# Patient Record
Sex: MALE | Race: WHITE | URBAN - METROPOLITAN AREA
[De-identification: names, ages, dates, MRNs, and addresses within clinical notes are randomized per-mention and may not be internally consistent; named-entity substitution may affect disease eponyms.]

---

## 2020-06-28 ENCOUNTER — HOSPITAL ENCOUNTER (EMERGENCY)
Facility: CLINIC | Age: 25
Discharge: HOME | End: 2020-06-28
Attending: EMERGENCY MEDICINE | Admitting: EMERGENCY MEDICINE
Payer: COMMERCIAL

## 2020-06-28 ENCOUNTER — APPOINTMENT (OUTPATIENT)
Dept: CT IMAGING | Facility: CLINIC | Age: 25
End: 2020-06-28
Attending: EMERGENCY MEDICINE
Payer: COMMERCIAL

## 2020-06-28 ENCOUNTER — APPOINTMENT (OUTPATIENT)
Dept: GENERAL RADIOLOGY | Facility: CLINIC | Age: 25
End: 2020-06-28
Attending: EMERGENCY MEDICINE
Payer: COMMERCIAL

## 2020-06-28 VITALS
HEIGHT: 70 IN | HEART RATE: 98 BPM | RESPIRATION RATE: 13 BRPM | SYSTOLIC BLOOD PRESSURE: 135 MMHG | WEIGHT: 190 LBS | OXYGEN SATURATION: 96 % | BODY MASS INDEX: 27.2 KG/M2 | DIASTOLIC BLOOD PRESSURE: 83 MMHG | TEMPERATURE: 98.8 F

## 2020-06-28 DIAGNOSIS — J18.9 PNEUMONIA OF RIGHT UPPER LOBE DUE TO INFECTIOUS ORGANISM: ICD-10-CM

## 2020-06-28 DIAGNOSIS — T40.601A OPIATE OVERDOSE, ACCIDENTAL OR UNINTENTIONAL, INITIAL ENCOUNTER (H): ICD-10-CM

## 2020-06-28 PROBLEM — R76.8 HEPATITIS C ANTIBODY TEST POSITIVE: Status: ACTIVE | Noted: 2018-08-01

## 2020-06-28 PROBLEM — F90.9 ADHD (ATTENTION DEFICIT HYPERACTIVITY DISORDER): Status: ACTIVE | Noted: 2020-06-28

## 2020-06-28 PROBLEM — F19.20 CHEMICAL DEPENDENCY (H): Status: ACTIVE | Noted: 2020-06-28

## 2020-06-28 LAB
ALBUMIN SERPL-MCNC: 4.4 G/DL (ref 3.4–5)
ALP SERPL-CCNC: 63 U/L (ref 40–150)
ALT SERPL W P-5'-P-CCNC: 147 U/L (ref 0–70)
AMPHETAMINES UR QL SCN: NEGATIVE
ANION GAP SERPL CALCULATED.3IONS-SCNC: 7 MMOL/L (ref 3–14)
APAP SERPL-MCNC: <2 MG/L (ref 10–20)
AST SERPL W P-5'-P-CCNC: 118 U/L (ref 0–45)
BARBITURATES UR QL: NEGATIVE
BASOPHILS # BLD AUTO: 0 10E9/L (ref 0–0.2)
BASOPHILS NFR BLD AUTO: 0.1 %
BENZODIAZ UR QL: NEGATIVE
BILIRUB SERPL-MCNC: 0.6 MG/DL (ref 0.2–1.3)
BUN SERPL-MCNC: 22 MG/DL (ref 7–30)
CALCIUM SERPL-MCNC: 8.5 MG/DL (ref 8.5–10.1)
CANNABINOIDS UR QL SCN: NEGATIVE
CHLORIDE SERPL-SCNC: 97 MMOL/L (ref 94–109)
CO2 SERPL-SCNC: 28 MMOL/L (ref 20–32)
COCAINE UR QL: NEGATIVE
CREAT SERPL-MCNC: 1.02 MG/DL (ref 0.66–1.25)
DIFFERENTIAL METHOD BLD: ABNORMAL
EOSINOPHIL # BLD AUTO: 0 10E9/L (ref 0–0.7)
EOSINOPHIL NFR BLD AUTO: 0.1 %
ERYTHROCYTE [DISTWIDTH] IN BLOOD BY AUTOMATED COUNT: 12.8 % (ref 10–15)
ETHANOL SERPL-MCNC: <0.01 G/DL
GFR SERPL CREATININE-BSD FRML MDRD: >90 ML/MIN/{1.73_M2}
GLUCOSE SERPL-MCNC: 226 MG/DL (ref 70–99)
HCT VFR BLD AUTO: 41.2 % (ref 40–53)
HGB BLD-MCNC: 14.2 G/DL (ref 13.3–17.7)
IMM GRANULOCYTES # BLD: 0.1 10E9/L (ref 0–0.4)
IMM GRANULOCYTES NFR BLD: 0.5 %
INTERPRETATION ECG - MUSE: NORMAL
LYMPHOCYTES # BLD AUTO: 1.1 10E9/L (ref 0.8–5.3)
LYMPHOCYTES NFR BLD AUTO: 5.7 %
MCH RBC QN AUTO: 30.3 PG (ref 26.5–33)
MCHC RBC AUTO-ENTMCNC: 34.5 G/DL (ref 31.5–36.5)
MCV RBC AUTO: 88 FL (ref 78–100)
MONOCYTES # BLD AUTO: 1.2 10E9/L (ref 0–1.3)
MONOCYTES NFR BLD AUTO: 6 %
NEUTROPHILS # BLD AUTO: 16.7 10E9/L (ref 1.6–8.3)
NEUTROPHILS NFR BLD AUTO: 87.6 %
NRBC # BLD AUTO: 0 10*3/UL
NRBC BLD AUTO-RTO: 0 /100
OPIATES UR QL SCN: POSITIVE
PCP UR QL SCN: NEGATIVE
PLATELET # BLD AUTO: 332 10E9/L (ref 150–450)
POTASSIUM SERPL-SCNC: 3.1 MMOL/L (ref 3.4–5.3)
PROT SERPL-MCNC: 8 G/DL (ref 6.8–8.8)
RBC # BLD AUTO: 4.68 10E12/L (ref 4.4–5.9)
SALICYLATES SERPL-MCNC: <2 MG/DL
SODIUM SERPL-SCNC: 132 MMOL/L (ref 133–144)
WBC # BLD AUTO: 19.1 10E9/L (ref 4–11)

## 2020-06-28 PROCEDURE — 99285 EMERGENCY DEPT VISIT HI MDM: CPT | Mod: 25

## 2020-06-28 PROCEDURE — 96374 THER/PROPH/DIAG INJ IV PUSH: CPT | Mod: 59

## 2020-06-28 PROCEDURE — 25000125 ZZHC RX 250: Performed by: EMERGENCY MEDICINE

## 2020-06-28 PROCEDURE — 25000132 ZZH RX MED GY IP 250 OP 250 PS 637: Performed by: EMERGENCY MEDICINE

## 2020-06-28 PROCEDURE — 93005 ELECTROCARDIOGRAM TRACING: CPT

## 2020-06-28 PROCEDURE — 25000128 H RX IP 250 OP 636: Performed by: EMERGENCY MEDICINE

## 2020-06-28 PROCEDURE — 96375 TX/PRO/DX INJ NEW DRUG ADDON: CPT | Mod: 59

## 2020-06-28 PROCEDURE — 80329 ANALGESICS NON-OPIOID 1 OR 2: CPT | Performed by: EMERGENCY MEDICINE

## 2020-06-28 PROCEDURE — 85025 COMPLETE CBC W/AUTO DIFF WBC: CPT | Performed by: EMERGENCY MEDICINE

## 2020-06-28 PROCEDURE — 80329 ANALGESICS NON-OPIOID 1 OR 2: CPT | Mod: 91 | Performed by: EMERGENCY MEDICINE

## 2020-06-28 PROCEDURE — 25000128 H RX IP 250 OP 636

## 2020-06-28 PROCEDURE — 80320 DRUG SCREEN QUANTALCOHOLS: CPT | Performed by: EMERGENCY MEDICINE

## 2020-06-28 PROCEDURE — 80053 COMPREHEN METABOLIC PANEL: CPT | Performed by: EMERGENCY MEDICINE

## 2020-06-28 PROCEDURE — 71046 X-RAY EXAM CHEST 2 VIEWS: CPT

## 2020-06-28 PROCEDURE — 80307 DRUG TEST PRSMV CHEM ANLYZR: CPT | Performed by: EMERGENCY MEDICINE

## 2020-06-28 PROCEDURE — 71275 CT ANGIOGRAPHY CHEST: CPT

## 2020-06-28 RX ORDER — IOPAMIDOL 755 MG/ML
60 INJECTION, SOLUTION INTRAVASCULAR ONCE
Status: COMPLETED | OUTPATIENT
Start: 2020-06-28 | End: 2020-06-28

## 2020-06-28 RX ORDER — NALOXONE HYDROCHLORIDE 0.4 MG/ML
0.2 INJECTION, SOLUTION INTRAMUSCULAR; INTRAVENOUS; SUBCUTANEOUS ONCE
Status: COMPLETED | OUTPATIENT
Start: 2020-06-28 | End: 2020-06-28

## 2020-06-28 RX ORDER — AZITHROMYCIN 250 MG/1
500 TABLET, FILM COATED ORAL ONCE
Status: COMPLETED | OUTPATIENT
Start: 2020-06-28 | End: 2020-06-28

## 2020-06-28 RX ORDER — AZITHROMYCIN 250 MG/1
250 TABLET, FILM COATED ORAL DAILY
Qty: 4 TABLET | Refills: 0 | Status: SHIPPED | OUTPATIENT
Start: 2020-06-28 | End: 2020-07-02

## 2020-06-28 RX ORDER — DIPHENHYDRAMINE HYDROCHLORIDE 50 MG/ML
25 INJECTION INTRAMUSCULAR; INTRAVENOUS ONCE
Status: COMPLETED | OUTPATIENT
Start: 2020-06-28 | End: 2020-06-28

## 2020-06-28 RX ORDER — DIPHENHYDRAMINE HYDROCHLORIDE 50 MG/ML
INJECTION INTRAMUSCULAR; INTRAVENOUS
Status: COMPLETED
Start: 2020-06-28 | End: 2020-06-28

## 2020-06-28 RX ADMIN — DIPHENHYDRAMINE HYDROCHLORIDE 25 MG: 50 INJECTION, SOLUTION INTRAMUSCULAR; INTRAVENOUS at 06:19

## 2020-06-28 RX ADMIN — AZITHROMYCIN MONOHYDRATE 500 MG: 250 TABLET ORAL at 07:11

## 2020-06-28 RX ADMIN — DIPHENHYDRAMINE HYDROCHLORIDE 25 MG: 50 INJECTION INTRAMUSCULAR; INTRAVENOUS at 06:19

## 2020-06-28 RX ADMIN — NALOXONE HYDROCHLORIDE 0.2 MG: 0.4 INJECTION, SOLUTION INTRAMUSCULAR; INTRAVENOUS; SUBCUTANEOUS at 05:12

## 2020-06-28 RX ADMIN — AMOXICILLIN AND CLAVULANATE POTASSIUM 1 TABLET: 875; 125 TABLET, FILM COATED ORAL at 07:10

## 2020-06-28 RX ADMIN — IOPAMIDOL 60 ML: 755 INJECTION, SOLUTION INTRAVENOUS at 06:04

## 2020-06-28 RX ADMIN — SODIUM CHLORIDE 86 ML: 9 INJECTION, SOLUTION INTRAVENOUS at 06:04

## 2020-06-28 ASSESSMENT — MIFFLIN-ST. JEOR: SCORE: 1848.08

## 2020-06-28 NOTE — ED NOTES
Call light answered by Sheree EDT. Patient reports need to urinate. Patient ambulated to BR. Gait stable. Patient given supplies and instructions to collect urine sample. Patient verbalized understanding. Patient unable to void. Patient ambulated back to ED room 18. Patient placed back on ECG NIBP and SaO2 monitoring. Patient's SaO2 79% on RA. Patient placed back on 2 LPM NC. SaO2 improved to 95%. Patient requests pen and paper. Patient provided with pen and paper to write down. EDT reports patient seems to be getting more tired, having difficulty keeping his eyes open. MD Shultz updated.

## 2020-06-28 NOTE — ED NOTES
Patient returned from imaging exam. Patient c/o generalized itching after receiving contrast dye. MD updated. VORB. Patient medicated as ordered. Patient placed back on ECG NIBP and SaO2 monitoring. Patient updated on continued POC and waits. Continue to monitor.

## 2020-06-28 NOTE — ED AVS SNAPSHOT
Emergency Department  [address] [facility] [address]  Phone:  [phone]  Fax:  [phone]                                    Milton Sanchez   MRN: [id number]    Department:   Emergency Department   Date of Visit:  6/28/2020           After Visit Summary Signature Page    I have received my discharge instructions, and my questions have been answered. I have discussed any challenges I see with this plan with the nurse or doctor.    ..........................................................................................................................................  Patient/Patient Representative Signature      ..........................................................................................................................................  Patient Representative Print Name and Relationship to Patient    ..................................................               ................................................  Date                                   Time    ..........................................................................................................................................  Reviewed by Signature/Title    ...................................................              ..............................................  Date                                               Time          22EPIC Rev 08/18

## 2020-06-28 NOTE — ED NOTES
Bed: ED18  Expected date:   Expected time:   Means of arrival:   Comments:  432 24M right overdose. Given narcan awake no covid

## 2020-06-28 NOTE — ED TRIAGE NOTES
Respirations are regular and unlabored. Patient SaO2 on RA in the 80s. Patient placed on 2 LPM NC. SaO2 improved to 94%. Patient denies any cough or shortness of breath. Patient skin is normal color, warm and clammy. Cap refill is less than 3 seconds. Patient GCS 14

## 2020-06-28 NOTE — ED PROVIDER NOTES
"History     Chief Complaint:  Drug Overdose       HPI  Milton Sanchez is a 24 year old year old male who presents for evaluation after apparent overdose.  Pt did admit to smoking heroine tonight.  EMS gave narcan and pt became alert and conversant.  Pt denies any other complaints.  He also denies alcohol use or any other ingestions        Allergies:  No Known Drug Allergies    Medications:   Medications reviewed. No pertinent medications.    Medical History:   ADHD  Chemical dependency  Hepatitis C antibody test positive  Mood disorder    Surgical History   Right arm surgery  Manipulation of nasal fracture    Family History:   Hypertension  Stroke  Depression    Social History:  Patient was not accompanied to the ED.   Smoking Status: Former Smoker   Smokeless Tobacco: Never Used  Alcohol Use: Positive  Drug Use: Positive (heroin very rarely)  Primary Care: No primary care provider on file.       Review of Systems   Constitutional: Negative for fever.   Respiratory: Negative for cough and shortness of breath.    Cardiovascular: Negative for chest pain.   Gastrointestinal: Negative for abdominal pain, nausea and vomiting.   Psychiatric/Behavioral: Negative for self-injury and suicidal ideas.   All other systems reviewed and are negative.      Physical Exam     Patient Vitals for the past 24 hrs:   BP Temp Temp src Heart Rate Resp SpO2 Height Weight   06/28/20 0308 (!) 141/90 98.8  F (37.1  C) Oral 121 20 95 % 1.778 m (5' 10\") 86.2 kg (190 lb)          Physical Exam  General: Appears well-developed and well-nourished.   Head: No signs of trauma.   CV: Mild tachycardia and regular rhythm.    Resp: Effort normal and breath sounds normal. No respiratory distress.   GI: Soft. There is no tenderness.  No rebound or guarding.  Normal bowel sounds.  No CVA tenderness.  MSK: Normal range of motion. no edema. No Calf tenderness.  Neuro: The patient is alert and oriented to person, place, and time.  Strength in upper/lower " extremities normal and symmetrical. Sensation normal. Speech normal.  GCS 15  Skin: Skin is warm and dry. No rash noted.   Psych: normal mood and affect. behavior is normal.       Emergency Department Course     ECG:  ECG taken at 0347, ECG read at 0354  Sinus tachycardia  Otherwise normal ECG  Rate 116 bpm. KY interval 130 ms. QRS duration 100 ms. QT/QTc 320/444 ms. P-R-T axes 41 57 6.    Imaging:  Radiology results were communicated with the patient who voiced understanding of the findings.    Chest XR,  PA & LAT   Final Result   IMPRESSION: Developing consolidative alveolar infiltrate right upper lobe. Findings compatible with developing right upper lobe pneumonia. Continued radiographic follow-up recommended. Left lung clear. No pleural fluid. Normal heart size. Normal    pulmonary vascularity. Osseous structures unremarkable.      CT Chest Pulmonary Embolism w Contrast    (Results Pending)     Reading per radiology     Laboratory:  Laboratory findings were communicated with the patient who voiced understanding of the findings.      Acetaminophen: <2  Salicylate: <2  Alcohol ethyl: <0.01    CBC: WBC 19.1 (H), HGB 14.2,   CMP:  (L), Potassium 3.1 (L), Glucose 226 (H),  (H),  (H) (Creatinine 1.02) o/w WNL     Interventions:   0512 Narcan 0.2 mg IV  Augmentin and azithromycin PO    Emergency Department Course:    0317 Nursing notes and vitals reviewed.    0329 I performed an exam of the patient as documented above.     0336 IV was inserted and blood was drawn for laboratory testing, results above.    0347 EKG obtained as noted above.    0444 The patient was sent for XR while in the emergency department, results above.     0525 The patient was sent for CT while in the emergency department, results above.       0700 Signed out to my partner, Dr. Byrd.    Impression & Plan       Medical Decision Making:  Patient presents after apparent heroin overdose.  Initially the patient was  somewhat hesitant to talk about what had happened, but he ultimately did admit that he had smoked heroin.  He denied any other drug use.  He got Narcan with EMS.  While he was alert and conversant, his O2 sats would go down somewhat so I give him another dose of Narcan.  Did obtain screening blood work and EKG which did show elevated white blood cell count but was otherwise reassuring.  Patient continue to be somewhat hypoxic with ambulation so did obtain a chest x-ray which showed a possible infiltrate.  Given his young age and the hypoxia, decided to obtain a CT scan as well.  This continued to show signs of infiltrate but no other significant lung disease.  Patient's O2 sats continue to be somewhat borderline and we discussed admission.  Patient very much did not desire to be admitted.  While he was alert and conversant, he did still seem somewhat easily tired and so decided to monitor him for a while longer to metabolize whatever he may have taken.  He did receive a dose of antibiotics in the ER will be given a prescription for antibiotics for outpatient use as well.  Patient signed up to Dr. Byrd.        Diagnosis:     ICD-10-CM    1. Opiate overdose, accidental or unintentional, initial encounter (H)  T40.601A Drug abuse screen urine   2. Pneumonia of right upper lobe due to infectious organism  J18.9           Discharge Medications:  Discharge Medication List as of 6/28/2020 10:59 AM      START taking these medications    Details   amoxicillin-clavulanate (AUGMENTIN) 875-125 MG tablet Take 1 tablet by mouth 2 times daily for 10 days, Disp-20 tablet,R-0, Local Print      azithromycin (ZITHROMAX) 250 MG tablet Take 1 tablet (250 mg) by mouth daily for 4 days, Disp-4 tablet,R-0, Local Print             Scribe Disclosure:  Amber ALEXIS, am serving as a scribe at 3:29 AM on 6/28/2020 to document services personally performed by Tyrone Shultz MD based on my observations and the provider's  statements to me.      Tyrone Shultz MD  06/29/20 4756

## 2020-06-28 NOTE — ED NOTES
RN called and spoke with [facility] officer regarding contact information for family. Officer reports patient's father does not speak English. Patient's brother acted as . Patient's brother contact information received from officer: [phone]. Information given to MD Shultz. MD attempted to call patient's brother and received immediate voicemail x 3. Voicemail left with requested call back by MD.

## 2020-06-28 NOTE — ED PROVIDER NOTES
"7:25 AM:  Signout from Dr Shultz.  Briefly patient presented with altered mental state and concern for opiate overdose.  He was given Narcan per EMS.  Few hours into stay he was de-satting to mid 70s with ambulation and thus he was given more Narcan which seemed to help somewhat.  Chest x-ray and CT reveal evidence of right upper lobe pneumonia/possible aspiration.  Patient was given azithromycin and Augmentin here.  Patient declined admission.  He was agreeable to further monitoring here in the ER in hopes that his hypoxemia resolves.  At signout, he was on room air with SPO2 hovering between high 80s and low 90s.  He is still somewhat sleepy but per primary MD assessment, but not holdable at this time.    8:53 AM: Reassessed patient.  He is off of supplemental oxygen.  He is still drowsy with pinpoint pupils.  When at rest, he desaturates to the high 70s/low 80s.  Supplemental O2 per nasal cannula was provided with resolution of hypoxemia.  Patient still does not want to stay but I placed on SIDRA hold due to concern for ongoing opiate intoxication and hypoxemia and inability to safely make informed decision.  Plan for another hour of monitoring and if still requiring supplemental oxygen, admit to the hospital.    11:02 AM: Patient has been off oxygen for about an hour now.  O2 sats have been in the low to mid 90s.  Patient is awake and alert.  I still encouraged hospital admission but at this time he is no longer holdable as there is no longer evidence of ongoing opiate intoxication.  Patient understands risks of going home including serious morbidity and death.  He is amenable to filling prescriptions for antibiotics.  He declined Narcan prescription unless it was \"free\".  Close return precautions were discussed prior to discharge.     Juan Antonio Byrd MD  06/28/20 1103    "

## 2020-06-28 NOTE — ED NOTES
Patient sitting upright on cart talking on his cell phone. Patient continues on ECG NIBP and SaO2 monitoring. VSS on 2 LPM NC. Continue to monitor.

## 2020-06-29 ASSESSMENT — ENCOUNTER SYMPTOMS
FEVER: 0
ABDOMINAL PAIN: 0
COUGH: 0
VOMITING: 0
SHORTNESS OF BREATH: 0
NAUSEA: 0